# Patient Record
Sex: MALE | Race: WHITE | NOT HISPANIC OR LATINO | URBAN - NONMETROPOLITAN AREA
[De-identification: names, ages, dates, MRNs, and addresses within clinical notes are randomized per-mention and may not be internally consistent; named-entity substitution may affect disease eponyms.]

---

## 2019-12-16 ENCOUNTER — IMPORTED ENCOUNTER (OUTPATIENT)
Dept: URBAN - NONMETROPOLITAN AREA CLINIC 1 | Facility: CLINIC | Age: 64
End: 2019-12-16

## 2019-12-16 PROBLEM — L72.3: Noted: 2019-12-16

## 2019-12-16 PROCEDURE — 92002 INTRM OPH EXAM NEW PATIENT: CPT

## 2020-01-06 ENCOUNTER — IMPORTED ENCOUNTER (OUTPATIENT)
Dept: URBAN - NONMETROPOLITAN AREA CLINIC 1 | Facility: CLINIC | Age: 65
End: 2020-01-06

## 2020-01-06 PROCEDURE — 67840 REMOVE EYELID LESION: CPT

## 2020-01-06 NOTE — PATIENT DISCUSSION
Sebaceous Cyst RUL - Discussed diagnoses with patient - Pt elects to proceed with surgical removal of Sebaceous Cyst - Post op instructions discussed and reviewed with patient Ayesha Turner sent to patients pharmacy

## 2020-01-20 ENCOUNTER — IMPORTED ENCOUNTER (OUTPATIENT)
Dept: URBAN - NONMETROPOLITAN AREA CLINIC 1 | Facility: CLINIC | Age: 65
End: 2020-01-20

## 2020-01-20 PROBLEM — Z98.890: Noted: 2020-01-20

## 2020-01-20 PROCEDURE — 99024 POSTOP FOLLOW-UP VISIT: CPT

## 2020-01-20 NOTE — PATIENT DISCUSSION
s/p Excision of Sebaceous Cyst RUL - No sutures present on todays visit. - Recommend follow-up with Dr. Reynaldo Le in 2-3 weeks due to residual lesion nasal to excision site. - Recommend D/C ointment.

## 2020-02-24 ENCOUNTER — IMPORTED ENCOUNTER (OUTPATIENT)
Dept: URBAN - NONMETROPOLITAN AREA CLINIC 1 | Facility: CLINIC | Age: 65
End: 2020-02-24

## 2020-02-24 PROCEDURE — 99024 POSTOP FOLLOW-UP VISIT: CPT

## 2020-02-24 NOTE — PATIENT DISCUSSION
s/p Excision of Sebaceous Cyst RUL - Rresidual lesion nasal to excision site now absorbed. - Recommend follow-up PRN.

## 2022-04-10 ASSESSMENT — VISUAL ACUITY
OD_SC: 20/50
OD_PH: 20/20
OS_SC: 20/25-2
OD_PH: 20/30+
OS_SC: 20/25
OD_SC: 20/40+2
OD_SC: 20/50
OS_SC: 20/25+

## 2025-02-26 ENCOUNTER — NEW PATIENT (OUTPATIENT)
Age: 70
End: 2025-02-26

## 2025-02-26 DIAGNOSIS — H52.03: ICD-10-CM

## 2025-02-26 DIAGNOSIS — Z96.1: ICD-10-CM

## 2025-02-26 DIAGNOSIS — H40.1134: ICD-10-CM

## 2025-02-26 DIAGNOSIS — H52.223: ICD-10-CM

## 2025-02-26 DIAGNOSIS — H52.4: ICD-10-CM

## 2025-02-26 PROCEDURE — 92015 DETERMINE REFRACTIVE STATE: CPT

## 2025-02-26 PROCEDURE — 92004 COMPRE OPH EXAM NEW PT 1/>: CPT

## 2025-02-26 PROCEDURE — 92250 FUNDUS PHOTOGRAPHY W/I&R: CPT
